# Patient Record
Sex: MALE | Race: WHITE | URBAN - METROPOLITAN AREA
[De-identification: names, ages, dates, MRNs, and addresses within clinical notes are randomized per-mention and may not be internally consistent; named-entity substitution may affect disease eponyms.]

---

## 2019-05-22 ENCOUNTER — OFFICE VISIT (OUTPATIENT)
Dept: FAMILY MEDICINE CLINIC | Facility: CLINIC | Age: 34
End: 2019-05-22
Payer: COMMERCIAL

## 2019-05-22 VITALS
HEIGHT: 66 IN | WEIGHT: 191.81 LBS | HEART RATE: 68 BPM | OXYGEN SATURATION: 98 % | RESPIRATION RATE: 12 BRPM | DIASTOLIC BLOOD PRESSURE: 64 MMHG | TEMPERATURE: 98 F | BODY MASS INDEX: 30.82 KG/M2 | SYSTOLIC BLOOD PRESSURE: 132 MMHG

## 2019-05-22 DIAGNOSIS — R09.81 NASAL CONGESTION: Primary | ICD-10-CM

## 2019-05-22 PROCEDURE — 99202 OFFICE O/P NEW SF 15 MIN: CPT | Performed by: NURSE PRACTITIONER

## 2019-05-22 NOTE — PATIENT INSTRUCTIONS
Flonase or fluticasone ( nasal spray) or nasacort  Fluticasone nasal spray  Brand Names: ClariSpray, Flonase, Flonase Allergy Relief, Flonase Sensimist, XHANCE  What is this medicine? FLUTICASONE (floo TIK a sone) is a corticosteroid.  This medicine is use smell  What may interact with this medicine?     · certain antibiotics like clarithromycin and telithromycin  · certain medicines for fungal infections like ketoconazole, itraconazole, and voriconazole  · conivaptan  · nefazodone  · some medicines for HIV

## 2019-05-22 NOTE — PROGRESS NOTES
CHIEF COMPLAINT:   Patient presents with:  URI      HPI:   Eder Roberson is a 35year old male who presents for upper respiratory symptoms for 6 days of congestion, sore throat, nasal congestion, feels overall okay without fever. No pain with swallow.  He Patient Instructions   Flonase or fluticasone ( nasal spray) or nasacort  Fluticasone nasal spray  Brand Names: ClariSpray, Flonase, Flonase Allergy Relief, Flonase Sensimist, XHANCE  What is this medicine?   FLUTICASONE (floo TIK a sone) is a corticost · headache  · unusual taste or smell  What may interact with this medicine?     · certain antibiotics like clarithromycin and telithromycin  · certain medicines for fungal infections like ketoconazole, itraconazole, and voriconazole  · conivaptan  · nefazod NOTE:This sheet is a summary. It may not cover all possible information. If you have questions about this medicine, talk to your doctor, pharmacist, or health care provider.  Copyright© 2019 [employer]            The patient indicates understanding of these i